# Patient Record
Sex: FEMALE | Race: WHITE | NOT HISPANIC OR LATINO | URBAN - METROPOLITAN AREA
[De-identification: names, ages, dates, MRNs, and addresses within clinical notes are randomized per-mention and may not be internally consistent; named-entity substitution may affect disease eponyms.]

---

## 2019-02-01 ENCOUNTER — IMPORTED ENCOUNTER (OUTPATIENT)
Dept: URBAN - METROPOLITAN AREA CLINIC 50 | Facility: CLINIC | Age: 64
End: 2019-02-01

## 2020-03-06 ENCOUNTER — IMPORTED ENCOUNTER (OUTPATIENT)
Dept: URBAN - METROPOLITAN AREA CLINIC 50 | Facility: CLINIC | Age: 65
End: 2020-03-06

## 2021-03-08 ENCOUNTER — IMPORTED ENCOUNTER (OUTPATIENT)
Dept: URBAN - METROPOLITAN AREA CLINIC 50 | Facility: CLINIC | Age: 66
End: 2021-03-08

## 2021-04-17 ASSESSMENT — VISUAL ACUITY
OS_BAT: 20/25
OS_CC: 20/20
OD_OTHER: 20/30. 20/40.
OS_OTHER: 20/25. 20/40.
OS_OTHER: 20/25. 20/30.
OD_CC: 20/25-2
OS_BAT: 20/20
OS_CC: J1+@ 16 IN
OD_BAT: 20/30
OS_CC: J1+
OD_OTHER: 20/30. 20/40.
OS_PH: @ 16 IN
OD_PH: @ 16 IN
OS_BAT: 20/25
OD_PH: 20/20
OS_OTHER: 20/20. 20/80.
OD_CC: J1+
OD_CC: J1+@ 16 IN
OD_BAT: 20/20
OS_SC: 20/30-1
OD_SC: 20/40
OS_CC: 20/20
OD_CC: 20/25-
OD_BAT: 20/30

## 2021-04-17 ASSESSMENT — TONOMETRY
OS_IOP_MMHG: 14
OS_IOP_MMHG: 14
OD_IOP_MMHG: 14
OD_IOP_MMHG: 15
OD_IOP_MMHG: 14
OS_IOP_MMHG: 15

## 2022-03-07 ENCOUNTER — COMPREHENSIVE EXAM (OUTPATIENT)
Dept: URBAN - METROPOLITAN AREA CLINIC 53 | Facility: CLINIC | Age: 67
End: 2022-03-07

## 2022-03-07 DIAGNOSIS — H25.813: ICD-10-CM

## 2022-03-07 DIAGNOSIS — Z01.01: ICD-10-CM

## 2022-03-07 DIAGNOSIS — H52.4: ICD-10-CM

## 2022-03-07 DIAGNOSIS — E11.9: ICD-10-CM

## 2022-03-07 PROCEDURE — 92015 DETERMINE REFRACTIVE STATE: CPT

## 2022-03-07 PROCEDURE — 92014 COMPRE OPH EXAM EST PT 1/>: CPT

## 2022-03-07 ASSESSMENT — KERATOMETRY
OD_K2POWER_DIOPTERS: 45.75
OS_AXISANGLE2_DEGREES: 96
OD_AXISANGLE2_DEGREES: 62
OD_AXISANGLE_DEGREES: 152
OS_K1POWER_DIOPTERS: 44.75
OS_K2POWER_DIOPTERS: 45.50
OS_AXISANGLE_DEGREES: 6
OD_K1POWER_DIOPTERS: 45.00

## 2022-03-07 ASSESSMENT — TONOMETRY
OS_IOP_MMHG: 15
OD_IOP_MMHG: 16

## 2022-03-07 ASSESSMENT — VISUAL ACUITY
OU_CC: J1+ @ 16 IN
OD_CC: 20/20-3
OS_CC: 20/20

## 2022-03-07 NOTE — PATIENT DISCUSSION
----- Message from Almita Hernandez sent at 5/13/2019  9:34 AM CDT -----  Contact: Pt  Name of Who is Calling: UCHE BAUM [[id number]]      What is the request in detail: Patient is requesting a call from staff to schedule a biopsy. Please contact to further discuss and advise      Can the clinic reply by [facility]: No       [facility]: [phone]        Retinal tear and detachment warning symptoms reviewed and patient instructed to call immediately if increasing floaters, flashes, or decreasing peripheral vision.

## 2024-03-12 ENCOUNTER — COMPREHENSIVE EXAM (OUTPATIENT)
Dept: URBAN - METROPOLITAN AREA CLINIC 48 | Facility: LOCATION | Age: 69
End: 2024-03-12

## 2024-03-12 DIAGNOSIS — H02.834: ICD-10-CM

## 2024-03-12 DIAGNOSIS — H43.811: ICD-10-CM

## 2024-03-12 DIAGNOSIS — H25.813: ICD-10-CM

## 2024-03-12 DIAGNOSIS — H52.4: ICD-10-CM

## 2024-03-12 DIAGNOSIS — Z01.01: ICD-10-CM

## 2024-03-12 DIAGNOSIS — D31.32: ICD-10-CM

## 2024-03-12 DIAGNOSIS — H04.123: ICD-10-CM

## 2024-03-12 DIAGNOSIS — E11.9: ICD-10-CM

## 2024-03-12 DIAGNOSIS — D31.31: ICD-10-CM

## 2024-03-12 DIAGNOSIS — H02.831: ICD-10-CM

## 2024-03-12 PROCEDURE — 92015 DETERMINE REFRACTIVE STATE: CPT

## 2024-03-12 PROCEDURE — 92014 COMPRE OPH EXAM EST PT 1/>: CPT

## 2024-03-12 ASSESSMENT — VISUAL ACUITY
OS_GLARE: 20/20
OU_CC: J1+@16
OS_GLARE: 20/20
OS_CC: 20/20
OD_GLARE: 20/20
OD_GLARE: 20/20
OD_CC: 20/25

## 2024-03-12 ASSESSMENT — KERATOMETRY
OD_AXISANGLE2_DEGREES: 60
OD_AXISANGLE_DEGREES: 150
OD_K1POWER_DIOPTERS: 45.50
OS_K2POWER_DIOPTERS: 45.75
OS_AXISANGLE2_DEGREES: 100
OS_AXISANGLE_DEGREES: 10
OD_K2POWER_DIOPTERS: 46.25
OS_K1POWER_DIOPTERS: 45.00

## 2024-03-12 ASSESSMENT — TONOMETRY
OS_IOP_MMHG: 14
OD_IOP_MMHG: 14

## 2025-03-10 ENCOUNTER — COMPREHENSIVE EXAM (OUTPATIENT)
Age: 70
End: 2025-03-10

## 2025-03-10 DIAGNOSIS — H25.813: ICD-10-CM

## 2025-03-10 DIAGNOSIS — E11.9: ICD-10-CM

## 2025-03-10 DIAGNOSIS — Z01.01: ICD-10-CM

## 2025-03-10 DIAGNOSIS — H52.4: ICD-10-CM

## 2025-03-10 PROCEDURE — 92015 DETERMINE REFRACTIVE STATE: CPT

## 2025-03-10 PROCEDURE — 92014 COMPRE OPH EXAM EST PT 1/>: CPT
